# Patient Record
Sex: MALE | Race: BLACK OR AFRICAN AMERICAN
[De-identification: names, ages, dates, MRNs, and addresses within clinical notes are randomized per-mention and may not be internally consistent; named-entity substitution may affect disease eponyms.]

---

## 2018-04-06 ENCOUNTER — HOSPITAL ENCOUNTER (EMERGENCY)
Dept: HOSPITAL 56 - MW.ED | Age: 28
Discharge: HOME | End: 2018-04-06
Payer: COMMERCIAL

## 2018-04-06 DIAGNOSIS — R06.6: Primary | ICD-10-CM

## 2018-04-06 PROCEDURE — 99284 EMERGENCY DEPT VISIT MOD MDM: CPT

## 2018-04-06 PROCEDURE — 93005 ELECTROCARDIOGRAM TRACING: CPT

## 2018-04-06 PROCEDURE — 96372 THER/PROPH/DIAG INJ SC/IM: CPT

## 2018-04-06 PROCEDURE — 71045 X-RAY EXAM CHEST 1 VIEW: CPT

## 2018-04-06 PROCEDURE — 70450 CT HEAD/BRAIN W/O DYE: CPT

## 2018-04-06 NOTE — EDM.PDOC
ED HPI GENERAL MEDICAL PROBLEM





- General


Chief Complaint: General


Stated Complaint: SHORTNESS OF BREATH/HICCUPS


Time Seen by Provider: 04/06/18 20:47





- History of Present Illness


INITIAL COMMENTS - FREE TEXT/NARRATIVE: 





HISTORY AND PHYSICAL:





History of present illness:


The patient is a 28-year-old male who presents with hiccups that were episodic 

for the last one week but that have been episodic but more continuous over the 

last 2 days. He has never had hiccups like this in the past and because of the 

hiccups he is having difficulty eating and drinking and has had episodic 

vomiting because of it. He has no chest pain on my evaluation no shortness of 

breath no abdominal pain no fevers and no chills; the patient has not had any 

recent trauma. The patient says he was in the  and had multiple 

concussions in the past but does not have any new headache or new head trauma 

recently. Has no neurosensory changes in his extremities. The patient has tried 

to gargle and that did not help with the hiccups. The patient has no systemic 

complaints on my evaluation other than feeling tired from the hiccups. He says 

he is very hungry but the hiccups are preventing him from eating and drinking





Review of systems: 


As per history of present illness and below otherwise all systems reviewed and 

negative.





Past medical history: 


As per history of present illness and as reviewed below otherwise 

noncontributory.





Surgical history: 


As per history of present illness and as reviewed below otherwise 

noncontributory.





Social history: 


No reported history of drug or alcohol abuse.





Family history: 


As per history of present illness and as reviewed below otherwise 

noncontributory.





Physical exam:


: Well-developed well-nourished man who is nontoxic and has hiccups. He's able 

to speak clearly and vital signs are noted by me


HEENT: Atraumatic, normocephalic, pupils reactive, negative for conjunctival 

pallor or scleral icterus, mucous membranes moist, throat clear, neck supple, 

nontender, trachea midline.


Lungs: Clear to auscultation, breath sounds equal bilaterally, chest nontender.


Heart: S1S2, regular rate and rhythm no overt murmurs


Abdomen: Soft, nondistended, nontender. Slightly hypoactive bowel sounds. 

Negative for costovertebral tenderness.


Pelvis: Stable nontender.


Genitourinary: Deferred.


Rectal: Deferred.


Extremities: Atraumatic, negative for cords or calf pain. Neurovascular 

unremarkable.


Neuro: Awake, alert, oriented. Cranial nerves II through XII unremarkable. 

Cerebellum unremarkable. Motor and sensory unremarkable throughout. Exam 

nonfocal.





Diagnostics:


EKG chest x-ray CT scan of the head





Therapeutics:


We attempted to have the patient drink a glass of water very quickly through 

straw as well as gargle to stop the hiccups when this was unsuccessful we 

ordered a dose of chlorpromazine IM





Patient received the chlorpromazine and is still having hiccups. In light of 

the fact that this medication may take 1-1-1/2 hours to work I will discharge 

the patient home with a prescription for Reglan, which is another modality that 

could help the hiccups. I will give him the Reglan via [employer] and will 

advise him to start it in the next 1-1/2-2 hours at the hiccups do not resolve. 

I have asked him to try the Reglan for the next 12 hours afterwards and if they 

do not improve then he is to return here to get another dose of chlorpromazine.





Impression: 


Hiccups, intractable





Definitive disposition and diagnosis as appropriate pending reevaluation and 

review of above.


  ** chest


Pain Score (Numeric/FACES): 5





- Related Data


 Allergies











Allergy/AdvReac Type Severity Reaction Status Date / Time


 


No Known Allergies Allergy   Verified 04/06/18 20:45











Home Meds: 


 Home Meds





. [No Known Home Meds]  04/06/18 [History]











ED ROS GENERAL





- Review of Systems


Review Of Systems: ROS reveals no pertinent complaints other than HPI.





ED EXAM, GENERAL





- Physical Exam


Exam: See Below (See dictation)





Course





- Vital Signs


Last Recorded V/S: 


 Last Vital Signs











Temp  37.0 C   04/06/18 20:45


 


Pulse  82   04/06/18 20:45


 


Resp  16   04/06/18 20:45


 


BP  163/87 H  04/06/18 20:45


 


Pulse Ox  97   04/06/18 20:45














- Orders/Labs/Meds


Orders: 


 Active Orders 24 hr











 Category Date Time Status


 


 EKG Documentation Completion [RC] STAT Care  04/06/18 20:47 Active


 


 Chest 1V Frontal [CR] Stat Exams  04/06/18 20:54 Taken


 


 Head wo Cont [CT] Stat Exams  04/06/18 20:53 Taken











Meds: 


Medications














Discontinued Medications














Generic Name Dose Route Start Last Admin





  Trade Name Kash  PRN Reason Stop Dose Admin


 


Chlorpromazine HCl  50 mg  04/06/18 20:50  04/06/18 21:30





  Thorazine  IM  04/06/18 20:51  50 mg





  ONETIME ONE   Administration





     





     





     





     














Departure





- Departure


Time of Disposition: 21:48


Disposition: Home, Self-Care 01


Condition: Good


Clinical Impression: 


 Intractable hiccups








- Discharge Information


Referrals: 


PCP,None [Primary Care Provider] - 


Forms:  ED Department Discharge


Additional Instructions: 


The following information is given to patients seen in the emergency department 

who are being discharged to home. This information is to outline your options 

for follow-up care. We provide all patients seen in our emergency department 

with a follow-up referral.





The need for follow-up, as well as the timing and circumstances, are variable 

depending upon the specifics of your emergency department visit.





If you don't have a primary care physician on staff, we will provide you with a 

referral. We always advise you to contact your personal physician following an 

emergency department visit to inform them of the circumstance of the visit and 

for follow-up with them and/or the need for any referrals to a consulting 

specialist.





The emergency department will also refer you to a specialist when appropriate. 

This referral assures that you have the opportunity for followup care with a 

specialist. All of these measure are taken in an effort to provide you with 

optimal care, which includes your followup.





Under all circumstances we always encourage you to contact your private 

physician who remains a resource for coordinating  your care. When calling for 

followup care, please make the office aware that this follow-up is from your 

recent emergency room visit. If for any reason you are refused follow-up, 

please contact the [facility] emergency 

department at [phone] and ask to speak to the emergency department 

charge nurse.





[facility] 


Primary care- Internal Medicine and Family [address] [address] [phone]








Please go home and rest and try to push small sips of fluids and small bites of 

food. If the hiccups do not resolve in the next 1-1/2-2 hours start the Reglan 

you have been given via [employer]. Continue that medication as prescribed for 

the next 12 hours and if the hiccups again do not resolve please return here 

for more medications and treatment. Return to the ER sooner as needed and 

discussed.





- My Orders


Last 24 Hours: 


My Active Orders





04/06/18 20:47


EKG Documentation Completion [RC] STAT 





04/06/18 20:53


Head wo Cont [CT] Stat 





04/06/18 20:54


Chest 1V Frontal [CR] Stat 














- Assessment/Plan


Last 24 Hours: 


My Active Orders





04/06/18 20:47


EKG Documentation Completion [RC] STAT 





04/06/18 20:53


Head wo Cont [CT] Stat 





04/06/18 20:54


Chest 1V Frontal [CR] Stat

## 2018-04-09 NOTE — CR
EXAM DATE: 18



PATIENT'S AGE: 28





Patient: KAVON MORE



Facility: [place], [place]

Patient ID: [id number]

Site Patient ID: [id number].

Site Accession #: [id number].

: 1990

Study: XRay Chest [id number]/6/2018 9:26:54 PM

Ordering Physician: Natividad Montes



Final Report: 

INDICATION: Hiccups x2w



TECHNIQUE:

Chest 1 view. 



COMPARISON:

None. 



FINDINGS:

Cardiovascular and mediastinum: Heart size and vasculature are normal in 
caliber and appearance. Mediastinum is within normal limits. 



Lungs and pleural space: Lungs are clear. No sign of infiltrate or mass. No 
sign of pleural effusion. No pneumothorax. 



Bones and soft tissues: No significant findings. 



IMPRESSION:

Unremarkable chest.







Dictated by: Buddy Brasher MD @ 2018 21:31:54

(Electronic Signature)



Report Signed by Proxy.
[facility]

## 2018-04-09 NOTE — CT
EXAM DATE: 18



PATIENT'S AGE: 28





Patient: KAVON MORE



Facility: [place], [place]

Patient ID: [id number]

Site Patient ID: [id number].

Site Accession #: [id number].

: 1990

Study: CT Head [id number]/6/2018 9:26:31 PM

Ordering Physician: Natividad Montes



Final Report: 

INDICATION:

Hiccups 



TECHNIQUE:

Noncontrast CT of the brain was performed with images acquired from skull base 
to vertex.





COMPARISON:

None available.





FINDINGS:

There is no acute intracranial hemorrhage. Ventricles are of normal size and 
morphology. No mass effect or midline shift is present. The gray-white matter 
differentiation is normal. The visualized portions of the orbits are normal. 
The visualized portions of the mastoids are normal. The visualized portions of 
the paranasal sinuses are normal. No fractures are identified. 





IMPRESSION:

Normal head CT



Please note that all CT scans at this facility use dose modulation, iterative 
reconstruction, and/or weight-based dosing when appropriate to reduce radiation 
dose to as low as reasonably achievable.



Dictated by Dayo Cancino MD @ 2018 9:27PM

(Electronic Signature)



Report Signed by Proxy.
[facility]

## 2018-06-09 ENCOUNTER — HOSPITAL ENCOUNTER (EMERGENCY)
Dept: HOSPITAL 56 - MW.ED | Age: 28
Discharge: HOME | End: 2018-06-09
Payer: SELF-PAY

## 2018-06-09 DIAGNOSIS — L03.114: Primary | ICD-10-CM

## 2018-06-09 PROCEDURE — 99281 EMR DPT VST MAYX REQ PHY/QHP: CPT

## 2018-06-09 PROCEDURE — 96372 THER/PROPH/DIAG INJ SC/IM: CPT

## 2018-06-09 NOTE — EDM.PDOC
ED HPI GENERAL MEDICAL PROBLEM





- General


Chief Complaint: Allergic Reaction


Stated Complaint: STUNG BY A HORNET


Time Seen by Provider: 06/09/18 19:00


Source of Information: Reports: Patient


History Limitations: Reports: No Limitations





- History of Present Illness


INITIAL COMMENTS - FREE TEXT/NARRATIVE: 





HISTORY AND PHYSICAL:





History of present illness:


[Comes to the emergency room complaining of left bicep redness and warmth and 

swelling. He received a hornet sting to his left bicep on June 2. He pulled out 

the stinger without any difficulty. That his arm has become more red and warm 

swollen and tender since then. He is taken one dose of Benadryl and 2 doses of 

ibuprofen which are not improving his symptoms. He denies nausea and vomiting, 

fever and chills. No tenderness under his armpits or in his neck. No abdominal 

pain nausea or vomiting. No change in appetite. He does not have a local 

primary care provider in [place]. He has no other complaints or concerns 

at this time.]





Review of systems: 


As per history of present illness and below otherwise all systems reviewed and 

negative.





Past medical history: 


As per history of present illness and as reviewed below otherwise 

noncontributory.





Surgical history: 


As per history of present illness and as reviewed below otherwise 

noncontributory.





Social history: 


No reported history of drug or alcohol abuse.





Family history: 


As per history of present illness and as reviewed below otherwise 

noncontributory.





Physical exam:


General: Well developed, Well nourished muscular black skinned male in NAD. 


HEENT: Atraumatic, normocephalic. Neck is supple, no lymphadenopathy is 

appreciated.


Lungs: Clear to auscultation, breath sounds equal bilaterally, chest nontender.


Heart: S1S2, regular, negative for clicks, rubs, or JVD.


Abdomen: Soft, nondistended, nontender. 


Pelvis: Stable nontender.


Genitourinary: Deferred.


Rectal: Deferred.


Extremities: Left lateral bicep shows a small indurated area which patient 

explains as the hornet sting. No stinger appreciated. He has hard and indurated 

area just lateral to this. Erythema surrounding this entire area to anterior 

and lateral aspect of his bicep. No streaking. No axillary lymphadenopathy. 

Left bicep is slightly enlarged compared with the right. Is tender with 

palpation. Neurovascular unremarkable.


Neuro: Awake, alert, oriented. Motor and sensory unremarkable throughout. Exam 

nonfocal.





Therapeutics:


[Toradol 60 mg IM]





Impression: 


[Cellulitis left bicep]





Plan:


[Patient is prescribed cephalexin 500 mg 3 times a day 10 days 0 refills, 

recommend daily antihistamine such as Claritin or Zyrtec. Keep clean and dry. 

Follow-up with local PCP in 3-4 days. Strict return precautions are reviewed. 

He is in agreement with today's plan.]





Definitive disposition and diagnosis as appropriate pending reevaluation and 

review of above.





  ** Left Arm


Pain Score (Numeric/FACES): 7





- Related Data


 Allergies











Allergy/AdvReac Type Severity Reaction Status Date / Time


 


No Known Allergies Allergy   Verified 04/06/18 20:45











Home Meds: 


 Home Meds





. [No Known Home Meds]  04/06/18 [History]











Past Medical History





- Past Health History


Medical/Surgical History: Denies Medical/Surgical History


HEENT History: Reports: None


Cardiovascular History: Reports: None


Respiratory History: Reports: None


Gastrointestinal History: Reports: None


Musculoskeletal History: Reports: Other (See Below)


Other Musculoskeletal History: jaw surgery


Neurological History: Reports: None


Psychiatric History: Reports: None


Endocrine/Metabolic History: Reports: None


Hematologic History: Reports: None


Dermatologic History: Reports: None





- Infectious Disease History


Infectious Disease History: Reports: None





Social & Family History





- Family History


Family Medical History: Noncontributory





ED ROS ALLERGIC REACTION





- Review of Systems


Review Of Systems: ROS reveals no pertinent complaints other than HPI.





ED EXAM GENERAL NO PERIP PULSE





- Physical Exam


Exam: See Below





Course





- Vital Signs


Last Recorded V/S: 


 Last Vital Signs











Temp  98.9 F   06/09/18 19:09


 


Pulse  109 H  06/09/18 19:09


 


Resp  16   06/09/18 19:09


 


BP  130/70   06/09/18 19:09


 


Pulse Ox  97   06/09/18 19:09














- Orders/Labs/Meds


Meds: 


Medications














Discontinued Medications














Generic Name Dose Route Start Last Admin





  Trade Name Freq  PRN Reason Stop Dose Admin


 


Ketorolac Tromethamine  60 mg  06/09/18 19:09  06/09/18 19:25





  Toradol  IM  06/09/18 19:10  60 mg





  ONETIME ONE   Administration





     





     





     





     














Departure





- Departure


Time of Disposition: 19:10


Disposition: Home, Self-Care 01


Condition: Good


Clinical Impression: 


 Cellulitis








- Discharge Information


Instructions:  Cellulitis, Adult, Easy-to-Read


Referrals: 


PCP,None [Primary Care Provider] - 


Additional Instructions: 


The following information is given to patients seen in the emergency department 

who are being discharged to home. This information is to outline your options 

for follow-up care. We provide all patients seen in our emergency department 

with a follow-up referral.





The need for follow-up, as well as the timing and circumstances, are variable 

depending upon the specifics of your emergency department visit.





If you don't have a primary care physician on staff, we will provide you with a 

referral. We always advise you to contact your personal physician following an 

emergency department visit to inform them of the circumstance of the visit and 

for follow-up with them and/or the need for any referrals to a consulting 

specialist.





The emergency department will also refer you to a specialist when appropriate. 

This referral assures that you have the opportunity for follow-up care with a 

specialist. All of these measure are taken in an effort to provide you with 

optimal care, which includes your follow-up.





Under all circumstances we always encourage you to contact your private 

physician who remains a resource for coordinating your care. When calling for 

follow-up care, please make the office aware that this follow-up is from your 

recent emergency room visit. If for any reason you are refused follow-up, 

please contact the [facility]  emergency 

department at [phone] and asked to speak to the emergency department 

charge nurse.








[facility]


Primary Care


[address] [place] [id number]


Phone: [phone]


Fax: [phone]








establish care with a local primary care provider at the clinic listed above 

and follow-up there in 3-4 days.


Alternate Tylenol w/ ibuprofen as needed for pain. Take medication as 

prescribed.


Return to ER as needed as discussed.

## 2021-11-18 NOTE — EDM.PDOC
ED HPI GENERAL MEDICAL PROBLEM





- General


Chief Complaint: Lower Extremity Injury/Pain


Stated Complaint: BACK INJURY


Time Seen by Provider: 11/18/21 00:10


Source of Information: Reports: Patient


History Limitations: Reports: No Limitations





- History of Present Illness


INITIAL COMMENTS - FREE TEXT/NARRATIVE: 





31-year-old male presents with right-sided muscular low back pain after picking 

up boxes yesterday.  He was picking up empty boxes and felt acute onset sharp 

pain localized to the right lumbar paraspinal muscle.  He states the pain 

started in his right Achilles and shot up to his back.  He currently denies any 

pain to his right Achilles or right lower extremity.  He took ibuprofen 400 mg 

and Tylenol with no relief.  He denies fever, chills, urinary or fecal 

incontinence, lower extremity numbness or weakness, history of IVDA or recent 

back surgery.





ROS: A 10-point review of systems, other than pertinent positives and negatives 

as stated per HPI, is otherwise negative





Past medical history: No additional pertinent history


Past Surgical history: No additional pertinent history


Social history: No additional pertinent history


Family history: No additional pertinent history


________________________________________________________________________________





PHYSICAL EXAM





General: AOx4, GCS = 15, moderate distress


HEENT: dry mucous membrane


Neck: supple, no meningismus, no Kernig or Brudzinski


Cardiac: S1S2 RRR


Respiratory: CTAB, no crackles or rales, no wheezing


Abdomen: Soft, nontender, no rebound or guarding, nondistended, no pulsatile 

mass.


Back: nontender to C/T/L-spine, reproducible muscular tenderness and swelling to

right lumbar paraspinal muscle.


Musculoskeletal: NVI distally, no deformity, negative Harden sign.


Neuro: No focal deficits, antalgic gait








  ** Right Foot


Pain Score (Numeric/FACES): 10





- Related Data


                                    Allergies











Allergy/AdvReac Type Severity Reaction Status Date / Time


 


No Known Allergies Allergy   Verified 11/17/21 21:31











Home Meds: 


                                    Home Meds





Cyclobenzaprine [Flexeril] 5 mg PO BID PRN #10 tab 11/18/21 [Rx]


Ibuprofen 800 mg PO Q6HR #15 tablet 11/18/21 [Rx]











Past Medical History





- Past Health History


Medical/Surgical History: Denies Medical/Surgical History


HEENT History: Reports: None


Cardiovascular History: Reports: None


Respiratory History: Reports: None


Gastrointestinal History: Reports: None


Musculoskeletal History: Reports: Other (See Below)


Other Musculoskeletal History: jaw surgery


Neurological History: Reports: None


Psychiatric History: Reports: None


Endocrine/Metabolic History: Reports: None


Hematologic History: Reports: None


Dermatologic History: Reports: None





- Infectious Disease History


Infectious Disease History: Reports: None





Social & Family History





- Family History


Family Medical History: No Pertinent Family History





- Tobacco Use


Second Hand Smoke Exposure: No





- Caffeine Use


Caffeine Use: Reports: None





- Recreational Drug Use


Recreational Drug Use: No





Review of Systems





- Review of Systems


Review Of Systems: See Below (see dictation)





ED EXAM, GENERAL





- Physical Exam


Exam: See Below (see dictation)





Course





- Vital Signs


Last Recorded V/S: 





                                Last Vital Signs











Temp  97.6 F   11/17/21 21:27


 


Pulse  94   11/17/21 21:27


 


Resp  20   11/17/21 21:27


 


BP  140/76   11/17/21 21:27


 


Pulse Ox  98   11/17/21 21:27














- Orders/Labs/Meds


Orders: 





                               Active Orders 24 hr











 Category Date Time Status


 


 Ketorolac [Toradol] Med  11/18/21 00:09 Once





 30 mg IM ONETIME ONE   


 


 Orphenadrine [Norflex] Med  11/18/21 00:09 Once





 60 mg IM ONETIME ONE   














- Re-Assessments/Exams


Free Text/Narrative Re-Assessment/Exam: 





11/18/21 00:11


After IM Toradol and Norflex in the ER, the patient improved and is currently 

stable for discharge. I performed a repeat exam and did not appreciate new 

abnormal findings.. I advised the patient to return to the ER for reevaluation 

if symptoms worsened, including fever, worsening pain, or any other worrisome 

symptoms. I instructed the patient to follow up with physical therapy within 2-3

 days.





________________________________________________________________________________





MEDICAL DECISION MAKING: This patient was evaluated during the COVID-19 pandemic

 where resources and capacity might be affected. I reviewed the patients past 

medical records, lab and radiographic findings. I discussed the case with the 

patient. My differential diagnosis included: Musculoskeletal strain.  Patient's 

back pain is suggestive of musculoskeletal strain. There are no complaints of 

urinary or fecal incontinence, focal numbness or weakness. The patient has a 

normal gait in the ER. There is no evidence of fever, IV drug use, recent back 

surgery, or immunocompromised state. I do not suspect caude equine syndrome or 

cord compression which would warrant further imaging.





Departure





- Departure


Time of Disposition: 00:12


Disposition: Home, Self-Care 01


Condition: Good


Clinical Impression: 


 Low back strain








- Discharge Information


*PRESCRIPTION DRUG MONITORING PROGRAM REVIEWED*: Not Applicable


*COPY OF PRESCRIPTION DRUG MONITORING REPORT IN PATIENT ARNALDO: Not Applicable


Prescriptions: 


Cyclobenzaprine [Flexeril] 5 mg PO BID PRN #10 tab


 PRN Reason: Pain


Ibuprofen 800 mg PO Q6HR #15 tablet


Instructions:  Lumbosacral Strain, Muscle Strain, Easy-to-Read


Referrals: 


PCP,None [Primary Care Provider] - 


Additional Instructions: 


The need for follow-up, as well as the timing and circumstances, are variable 

depending upon the specifics of your emergency department visit.





If you don't have a primary care physician on staff, we will provide you with a 

referral. We always advise you to contact your personal physician following an 

emergency department visit to inform them of the circumstance of the visit and 

for follow-up with them and/or the need for any referrals to a consulting 

specialist.





The emergency department will also refer you to a specialist when appropriate. T

his referral assures that you have the opportunity for follow-up care with a 

specialist. All of these measure are taken in an effort to provide you with 

optimal care, which includes your follow-up.





Under all circumstances we always encourage you to contact your private 

physician who remains a resource for coordinating your care. When calling for 

follow-up care, please make the office aware that this follow-up is from your 

recent emergency room visit. If for any reason you are refused follow-up, please

contact the [facility] Emergency Department

at [phone] and asked to speak to the emergency department charge nurse.





If you do not have a primary care doctor, please follow up with the clinics 

below within 3-5 days. 





Rehab Services


Rehabilitation Services at [facility]


(Physical Therapy, Occupational Therapy, Speech Therapy)


[facility]


[address] [place] [address], [address]


[place] [address]


Phone: [phone]


Fax: [phone].








Sepsis Event Note (ED)





- Evaluation


Sepsis Screening Result: No Definite Risk





- Focused Exam


Vital Signs: 





                                   Vital Signs











  Temp Pulse Resp BP Pulse Ox


 


 11/17/21 21:27  97.6 F  94  20  140/76  98














- My Orders


Last 24 Hours: 





My Active Orders





11/18/21 00:09


Ketorolac [Toradol]   30 mg IM ONETIME ONE 


Orphenadrine [Norflex]   60 mg IM ONETIME ONE 














- Assessment/Plan


Last 24 Hours: 





My Active Orders





11/18/21 00:09


Ketorolac [Toradol]   30 mg IM ONETIME ONE 


Orphenadrine [Norflex]   60 mg IM ONETIME ONE

## 2021-11-18 NOTE — CT
Indication:



Lower back pain



Technique:



Nonenhanced axial CT imaging through the lumbar spine. Sagittal and coronal 

reconstructions are provided.



Comparison:



None



Findings:



The lumbar vertebral bodies are normal in height. There is no evidence of 

acute fracture. There is no prevertebral edema or paraspinal hematoma. 

Spinal alignment is normal. There is no significant degenerative disc 

height loss. 



At T11-12, T12-L1, L1-2, and L2-3, there is no appreciable disc herniation. 

The spinal canal and neural foramina demonstrate normal caliber. 



At L3-4, there is mild circumferential disc herniation without appreciable 

spinal stenosis or neural foraminal stenosis. 



At L4-5, there is a broad-based disc herniation causing at least mild 

spinal stenosis. There is no appreciable narrowing of the neural foramina. 



At L5-S1, minimal disc bulging is suggested, without appreciable spinal 

stenosis or neural foraminal stenosis. 



Impression:



1. No acute fracture or traumatic malalignment. 



2. At least mild spinal stenosis at L4-5 secondary to broad-based disc 

herniation. 



3. No appreciable spinal stenosis at the remaining lumbar levels.



Please note that all CT scans at this facility use dose modulation, 

iterative reconstruction, and/or weight-based dosing when appropriate to 

reduce radiation dose to as low as reasonably achievable.



Dictated by Suraj Tapia MD @ 11/18/2021 3:27:20 PM



(Electronically Signed)